# Patient Record
Sex: FEMALE | Race: WHITE | NOT HISPANIC OR LATINO | Employment: UNEMPLOYED | URBAN - METROPOLITAN AREA
[De-identification: names, ages, dates, MRNs, and addresses within clinical notes are randomized per-mention and may not be internally consistent; named-entity substitution may affect disease eponyms.]

---

## 2024-08-13 ENCOUNTER — APPOINTMENT (OUTPATIENT)
Dept: ULTRASOUND IMAGING | Facility: CLINIC | Age: 19
End: 2024-08-13
Payer: COMMERCIAL

## 2024-08-13 ENCOUNTER — HOSPITAL ENCOUNTER (EMERGENCY)
Facility: CLINIC | Age: 19
Discharge: HOME | End: 2024-08-13
Attending: EMERGENCY MEDICINE | Admitting: EMERGENCY MEDICINE
Payer: COMMERCIAL

## 2024-08-13 VITALS
TEMPERATURE: 98.1 F | OXYGEN SATURATION: 100 % | SYSTOLIC BLOOD PRESSURE: 110 MMHG | HEART RATE: 73 BPM | WEIGHT: 167 LBS | DIASTOLIC BLOOD PRESSURE: 81 MMHG | RESPIRATION RATE: 14 BRPM

## 2024-08-13 DIAGNOSIS — Z34.91 FIRST TRIMESTER PREGNANCY: ICD-10-CM

## 2024-08-13 DIAGNOSIS — R10.9 ABDOMINAL PAIN: ICD-10-CM

## 2024-08-13 LAB
ALBUMIN UR-MCNC: 20 MG/DL
ANION GAP SERPL CALCULATED.3IONS-SCNC: 13 MMOL/L (ref 7–15)
APPEARANCE UR: ABNORMAL
BACTERIA #/AREA URNS HPF: ABNORMAL /HPF
BASOPHILS # BLD AUTO: 0 10E3/UL (ref 0–0.2)
BASOPHILS NFR BLD AUTO: 0 %
BILIRUB UR QL STRIP: NEGATIVE
BUN SERPL-MCNC: 4.4 MG/DL (ref 6–20)
CALCIUM SERPL-MCNC: 9 MG/DL (ref 8.8–10.4)
CHLORIDE SERPL-SCNC: 104 MMOL/L (ref 98–107)
COLOR UR AUTO: YELLOW
CREAT SERPL-MCNC: 0.51 MG/DL (ref 0.51–0.95)
EGFRCR SERPLBLD CKD-EPI 2021: >90 ML/MIN/1.73M2
EOSINOPHIL # BLD AUTO: 0.1 10E3/UL (ref 0–0.7)
EOSINOPHIL NFR BLD AUTO: 1 %
ERYTHROCYTE [DISTWIDTH] IN BLOOD BY AUTOMATED COUNT: 13.3 % (ref 10–15)
GLUCOSE SERPL-MCNC: 84 MG/DL (ref 70–99)
GLUCOSE UR STRIP-MCNC: NEGATIVE MG/DL
HCO3 SERPL-SCNC: 22 MMOL/L (ref 22–29)
HCT VFR BLD AUTO: 37.2 % (ref 35–47)
HGB BLD-MCNC: 12.6 G/DL (ref 11.7–15.7)
HGB UR QL STRIP: ABNORMAL
HYALINE CASTS: 5 /LPF
IMM GRANULOCYTES # BLD: 0 10E3/UL
IMM GRANULOCYTES NFR BLD: 0 %
KETONES UR STRIP-MCNC: 10 MG/DL
LEUKOCYTE ESTERASE UR QL STRIP: NEGATIVE
LYMPHOCYTES # BLD AUTO: 2.2 10E3/UL (ref 0.8–5.3)
LYMPHOCYTES NFR BLD AUTO: 26 %
MCH RBC QN AUTO: 27.5 PG (ref 26.5–33)
MCHC RBC AUTO-ENTMCNC: 33.9 G/DL (ref 31.5–36.5)
MCV RBC AUTO: 81 FL (ref 78–100)
MONOCYTES # BLD AUTO: 0.4 10E3/UL (ref 0–1.3)
MONOCYTES NFR BLD AUTO: 4 %
MUCOUS THREADS #/AREA URNS LPF: PRESENT /LPF
NEUTROPHILS # BLD AUTO: 5.6 10E3/UL (ref 1.6–8.3)
NEUTROPHILS NFR BLD AUTO: 68 %
NITRATE UR QL: NEGATIVE
NRBC # BLD AUTO: 0 10E3/UL
NRBC BLD AUTO-RTO: 0 /100
PH UR STRIP: 5.5 [PH] (ref 5–7)
PLATELET # BLD AUTO: 319 10E3/UL (ref 150–450)
POTASSIUM SERPL-SCNC: 3.7 MMOL/L (ref 3.4–5.3)
RBC # BLD AUTO: 4.59 10E6/UL (ref 3.8–5.2)
RBC URINE: 1 /HPF
SODIUM SERPL-SCNC: 139 MMOL/L (ref 135–145)
SP GR UR STRIP: 1.03 (ref 1–1.03)
SQUAMOUS EPITHELIAL: 5 /HPF
UROBILINOGEN UR STRIP-MCNC: <2 MG/DL
WBC # BLD AUTO: 8.2 10E3/UL (ref 4–11)
WBC URINE: 3 /HPF

## 2024-08-13 PROCEDURE — 80048 BASIC METABOLIC PNL TOTAL CA: CPT | Performed by: EMERGENCY MEDICINE

## 2024-08-13 PROCEDURE — 85049 AUTOMATED PLATELET COUNT: CPT | Performed by: EMERGENCY MEDICINE

## 2024-08-13 PROCEDURE — 99285 EMERGENCY DEPT VISIT HI MDM: CPT | Mod: 25

## 2024-08-13 PROCEDURE — 81001 URINALYSIS AUTO W/SCOPE: CPT

## 2024-08-13 PROCEDURE — 36415 COLL VENOUS BLD VENIPUNCTURE: CPT | Performed by: EMERGENCY MEDICINE

## 2024-08-13 PROCEDURE — 76801 OB US < 14 WKS SINGLE FETUS: CPT

## 2024-08-13 RX ORDER — METOCLOPRAMIDE HYDROCHLORIDE 5 MG/ML
10 INJECTION INTRAMUSCULAR; INTRAVENOUS ONCE
Status: DISCONTINUED | OUTPATIENT
Start: 2024-08-13 | End: 2024-08-13

## 2024-08-13 ASSESSMENT — COLUMBIA-SUICIDE SEVERITY RATING SCALE - C-SSRS
2. HAVE YOU ACTUALLY HAD ANY THOUGHTS OF KILLING YOURSELF IN THE PAST MONTH?: NO
1. IN THE PAST MONTH, HAVE YOU WISHED YOU WERE DEAD OR WISHED YOU COULD GO TO SLEEP AND NOT WAKE UP?: NO
6. HAVE YOU EVER DONE ANYTHING, STARTED TO DO ANYTHING, OR PREPARED TO DO ANYTHING TO END YOUR LIFE?: NO

## 2024-08-13 NOTE — DISCHARGE INSTRUCTIONS
You were seen here in the ER for vaginal discharge/spotting. Your ultrasound shows a healthy pregnancy at 13 weeks and 3 days. Also shows a subplacental hemorrhage which we believe is the cause for your spotting during pregnancy but is overall a benign condition.  Call your OB/GYN to schedule follow-up.  They may want to repeat an ultrasound in approximately 1 week to make sure that baby is growing appropriately.  Return if you develop any Urination symptoms or any other concerning symptoms

## 2024-08-13 NOTE — ED TRIAGE NOTES
Patient arrived with c/o cramping and spotting and brown discharge. Patient reports that she is 13 weeks pregnant. Patient reports also repots hx iron deficiency. Patient reports morning sickness with nausea and vomiting. Denies chills or fever.

## 2024-08-13 NOTE — ED PROVIDER NOTES
EMERGENCY DEPARTMENT ENCOUNTER      NAME: Noemí Tyler  AGE: 18 year old female  YOB: 2005  MRN: [id number]  EVALUATION DATE & TIME: No admission date for patient encounter.    PCP: [facility] [place]    ED PROVIDER: Rhea Broussard PA-C      Chief Complaint   Patient presents with    Vaginal Discharge    Abdominal Pain         FINAL IMPRESSION:  1. Abdominal pain    2. First trimester pregnancy          ED COURSE & MEDICAL DECISION MAKING:    Pertinent Labs & Imaging studies reviewed. (See chart for details)  18 year old female presents to the Emergency Department for evaluation of vaginal spotting, discharge, and abdominal pain.  Vitals unremarkable.  Patient is not hypertensive.  She is afebrile, not tachycardic, hypoxic, tachypneic.  On examination, well-appearing 18-year-old no acute distress.  Abdomen nontender throughout.     Will obtain ultrasound to assess pregnancy.  Will also obtain urinalysis given lower abdominal pain and CBC/BMP. US reveals single living IUP 13 weeks and 3 days with a sub placental hemorrhage. Urinalysis with few bacteria but 5 squamous cells so this is likely a contaminate. Patient is having no urinary symptoms including frequency, urgency, dysuria, hematuria.  No anemia or leukocytosis noted. No LAMBERT. Updated patient on these results. Her spotting is likely secondary to the sub placental hemorrhage. Recommended  she follow up with her OB. Offered antibiotics or a repeat urinalysis and patient would like to hold off and if she develops any urinary symptoms, she will follow up with her OB. All questions answered. Discharged in stable condition.     ED Course as of 08/13/24 1937 Tue Aug 13, 2024   1550 US OB < 14 Weeks Single  Ultrasound independently interpreted by myself with single living IUP at 13 weeks 3 days with fetal heart rate 156   1610 Pt is RH positive       At the conclusion of the encounter I discussed the results of all of the tests and  the disposition. The questions were answered. The patient or family acknowledged understanding and was agreeable with the care plan.     0 minutes of critical care time     MEDICATIONS GIVEN IN THE EMERGENCY:  Medications - No data to display      NEW PRESCRIPTIONS STARTED AT TODAY'S ER VISIT  There are no discharge medications for this patient.    =================================================================    HPI    Patient information was obtained from: patient  Use of : N/A     18 year old female  presents to the Emergency Department for evaluation of vaginal spotting, discharge, and abdominal pain. Abdominal pain has been going on for the past few weeks, was a dull ache, but the past week became sharp lower abdominal pain. Has had brown discharge since yesterday AM as well as some spotting this AM when she wiped and in her underwear. Endorses morning nausea/vomiting throughout the pregnancy. Feeling lightheaded today. Was told she has an iron deficiency by her OB and to start iron tablets but hasn't picked them up from the pharmacy. Follows with Janki Maravilla with [facility] for [facility] care. No complications with pregnancy thus far.     PAST MEDICAL HISTORY:  No past medical history on file.    PAST SURGICAL HISTORY:  No past surgical history on file.        CURRENT MEDICATIONS:    No current outpatient medications on file.      ALLERGIES:  No Known Allergies    FAMILY HISTORY:  No family history on file.    SOCIAL HISTORY:   Social History     Socioeconomic History    Marital status: Single     Social Determinants of Health     Financial Resource Strain: Low Risk  (2022)    Received from [facility]    Overall Financial Resource Strain (CARDIA)     Difficulty of Paying Living Expenses: Not very hard   Food Insecurity: Food Insecurity Present (2022)    Received from [facility]    Hunger Vital Sign     Worried About Running Out of Food in the Last Year: Sometimes true     Ran Out of Food  in the Last Year: Sometimes true   Transportation Needs: No Transportation Needs (11/22/2022)    Received from [facility]    PRAPARE - Transportation     Lack of Transportation (Medical): No     Lack of Transportation (Non-Medical): No   Physical Activity: Unknown (11/22/2022)    Received from [facility]    Exercise Vital Sign     Days of Exercise per Week: 5 days     Minutes of Exercise per Session: Patient declined   Stress: Stress Concern Present (11/22/2022)    Received from [facility]    [employer] - Occupational Stress Questionnaire     Feeling of Stress : Very much    Received from [facility]    Social Connections   Interpersonal Safety: Not At Risk (11/22/2022)    Received from [facility]    Humiliation, Afraid, Rape, and Kick questionnaire     Fear of Current or Ex-Partner: No     Emotionally Abused: No     Physically Abused: No     Sexually Abused: No   Housing Stability: High Risk (11/22/2022)    Received from [facility]    Housing Stability Vital Sign     Number of Places Lived in the Last Year: 3     Unstable Housing in the Last Year: No       VITALS:  /81   Pulse 73   Temp 98.1  F (36.7  C) (Tympanic)   Resp 14   Wt 75.8 kg (167 lb)   LMP 05/09/2024 (Approximate)   SpO2 100%     PHYSICAL EXAM    Constitutional: Well developed, Well nourished, NAD, GCS 15   HENT: Normocephalic, Atraumatic, mucous membranes moist, Nose normal. Neck-  Normal range of motion, No tenderness, Supple, No stridor.    Eyes: EOMI, Conjunctiva normal, No discharge.   GI:Bowel sounds normal, Soft, No tenderness, No masses, No flank tenderness. No rebound or guarding.    Musculoskeletal:  No edema. No cyanosis, No clubbing. Good range of motion in all major joints.   Integument: Warm, Dry, No erythema, No rash.    Neurologic: Alert & oriented x 3, Normal motor function, Normal sensory function, No focal deficits noted. Normal gait.    Psychiatric:  Affect normal, Judgment normal, Mood normal. Cooperative.      LAB:  All pertinent labs reviewed and interpreted.  Results for orders placed or performed during the hospital encounter of 08/13/24   US OB < 14 Weeks Single    Impression    IMPRESSION:   1.  Single living intrauterine gestation at 13 weeks and 3 days, EDC 2/15/2025.   2.  Findings suspicious for a small retroplacental hemorrhage.       Basic metabolic panel   Result Value Ref Range    Sodium 139 135 - 145 mmol/L    Potassium 3.7 3.4 - 5.3 mmol/L    Chloride 104 98 - 107 mmol/L    Carbon Dioxide (CO2) 22 22 - 29 mmol/L    Anion Gap 13 7 - 15 mmol/L    Urea Nitrogen 4.4 (L) 6.0 - 20.0 mg/dL    Creatinine 0.51 0.51 - 0.95 mg/dL    GFR Estimate >90 >60 mL/min/1.73m2    Calcium 9.0 8.8 - 10.4 mg/dL    Glucose 84 70 - 99 mg/dL   UA with Microscopic reflex to Culture    Specimen: Urine, Clean Catch   Result Value Ref Range    Color Urine Yellow Colorless, Straw, Light Yellow, Yellow    Appearance Urine Turbid (A) Clear    Glucose Urine Negative Negative mg/dL    Bilirubin Urine Negative Negative    Ketones Urine 10 (A) Negative mg/dL    Specific Gravity Urine 1.028 1.001 - 1.030    Blood Urine 0.03 mg/dL (A) Negative    pH Urine 5.5 5.0 - 7.0    Protein Albumin Urine 20 (A) Negative mg/dL    Urobilinogen Urine <2.0 <2.0 mg/dL    Nitrite Urine Negative Negative    Leukocyte Esterase Urine Negative Negative    Bacteria Urine Few (A) None Seen /HPF    Mucus Urine Present (A) None Seen /LPF    RBC Urine 1 <=2 /HPF    WBC Urine 3 <=5 /HPF    Squamous Epithelials Urine 5 (H) <=1 /HPF    Hyaline Casts Urine 5 (H) <=2 /LPF   CBC with platelets and differential   Result Value Ref Range    WBC Count 8.2 4.0 - 11.0 10e3/uL    RBC Count 4.59 3.80 - 5.20 10e6/uL    Hemoglobin 12.6 11.7 - 15.7 g/dL    Hematocrit 37.2 35.0 - 47.0 %    MCV 81 78 - 100 fL    MCH 27.5 26.5 - 33.0 pg    MCHC 33.9 31.5 - 36.5 g/dL    RDW 13.3 10.0 - 15.0 %    Platelet Count 319 150 - 450  10e3/uL    % Neutrophils 68 %    % Lymphocytes 26 %    % Monocytes 4 %    % Eosinophils 1 %    % Basophils 0 %    % Immature Granulocytes 0 %    NRBCs per 100 WBC 0 <1 /100    Absolute Neutrophils 5.6 1.6 - 8.3 10e3/uL    Absolute Lymphocytes 2.2 0.8 - 5.3 10e3/uL    Absolute Monocytes 0.4 0.0 - 1.3 10e3/uL    Absolute Eosinophils 0.1 0.0 - 0.7 10e3/uL    Absolute Basophils 0.0 0.0 - 0.2 10e3/uL    Absolute Immature Granulocytes 0.0 <=0.4 10e3/uL    Absolute NRBCs 0.0 10e3/uL       RADIOLOGY:  Reviewed all pertinent imaging. Please see official radiology report.  US OB < 14 Weeks Single   Final Result   IMPRESSION:    1.  Single living intrauterine gestation at 13 weeks and 3 days, EDC 2/15/2025.    2.  Findings suspicious for a small retroplacental hemorrhage.                Rhea Broussard PA-C  [facility] EMERGENCY ROOM  [address] [id number] [phone]       Rhea Broussard PA-C  08/13/24 1937

## 2024-08-13 NOTE — ED PROVIDER NOTES
I, Janie Mccabe have reviewed the documentation, personally taken the patient's history, performed an exam and agree with the physical finds, diagnosis and management plan.    HPI:   approx 13wk. Has had US w DR. Taiwo Watts ob at 10wk unremarkable. 1wk sharp lower abd pain intermittent. Now x24hrs dark brown discharge, spotting when wipes.     Iron def didn't start iron supplement, mild lightheaded.     Physical Exam: Unremarkable abdominal examination without reproducible tenderness to palpation.  Regular rate, rhythm.    MDM: Threatened AB, missed AB, subchorionic hemorrhage    ED Course/workup: Laboratory workup notable for contaminated appearing urine, not symptomatic but will give the patient option for empiric treatment because of her pregnancy.  Patient is Rh+ per chart review.  Ultrasound today shows single intrauterine pregnancy with a cell placental hemorrhage which I think is the etiology of her vaginal bleeding.  Encouraged to follow-up with closely with OB/GYN for repeat ultrasound in approximately 1 week for viability.      ED Course as of [id number]   Tue Aug 13, 2024   1550 US OB < 14 Weeks Single  Ultrasound independently interpreted by myself with single living IUP at 13 weeks 3 days with fetal heart rate 156   1610 Pt is RH positive         Final Diagnosis:     ICD-10-CM    1. Abdominal pain  R10.9       2. First trimester pregnancy  Z34.91               I personally saw the patient and performed a substantive portion of the visit including all aspects of the medical decision making.  I personally made/approved the management plan and take responsibility for the patient management.     MD Eliot Poon Zabrina N, MD  [id number] [phone]